# Patient Record
Sex: FEMALE | Race: WHITE | Employment: FULL TIME | URBAN - NONMETROPOLITAN AREA
[De-identification: names, ages, dates, MRNs, and addresses within clinical notes are randomized per-mention and may not be internally consistent; named-entity substitution may affect disease eponyms.]

---

## 2024-02-23 ENCOUNTER — HOSPITAL ENCOUNTER (OUTPATIENT)
Age: 29
Discharge: HOME | End: 2024-02-23
Attending: FAMILY MEDICINE
Payer: COMMERCIAL

## 2024-02-23 DIAGNOSIS — R55 SYNCOPE, UNSPECIFIED SYNCOPE TYPE: ICD-10-CM

## 2024-02-23 PROCEDURE — 93270 REMOTE 30 DAY ECG REV/REPORT: CPT

## 2025-04-07 SDOH — HEALTH STABILITY: PHYSICAL HEALTH: ON AVERAGE, HOW MANY DAYS PER WEEK DO YOU ENGAGE IN MODERATE TO STRENUOUS EXERCISE (LIKE A BRISK WALK)?: 4 DAYS

## 2025-04-07 SDOH — HEALTH STABILITY: PHYSICAL HEALTH: ON AVERAGE, HOW MANY MINUTES DO YOU ENGAGE IN EXERCISE AT THIS LEVEL?: 30 MIN

## 2025-04-09 ENCOUNTER — OFFICE VISIT (OUTPATIENT)
Dept: FAMILY MEDICINE CLINIC | Age: 30
End: 2025-04-09
Payer: COMMERCIAL

## 2025-04-09 VITALS
TEMPERATURE: 98.5 F | WEIGHT: 165.2 LBS | BODY MASS INDEX: 29.27 KG/M2 | DIASTOLIC BLOOD PRESSURE: 94 MMHG | SYSTOLIC BLOOD PRESSURE: 142 MMHG | RESPIRATION RATE: 16 BRPM | OXYGEN SATURATION: 99 % | HEART RATE: 122 BPM | HEIGHT: 63 IN

## 2025-04-09 DIAGNOSIS — R00.0 TACHYCARDIA: ICD-10-CM

## 2025-04-09 DIAGNOSIS — R55 SYNCOPE, UNSPECIFIED SYNCOPE TYPE: ICD-10-CM

## 2025-04-09 DIAGNOSIS — Z00.00 ENCOUNTER FOR MEDICAL EXAMINATION TO ESTABLISH CARE: Primary | ICD-10-CM

## 2025-04-09 PROCEDURE — 99204 OFFICE O/P NEW MOD 45 MIN: CPT | Performed by: NURSE PRACTITIONER

## 2025-04-09 PROCEDURE — 90471 IMMUNIZATION ADMIN: CPT | Performed by: NURSE PRACTITIONER

## 2025-04-09 PROCEDURE — 93000 ELECTROCARDIOGRAM COMPLETE: CPT | Performed by: NURSE PRACTITIONER

## 2025-04-09 PROCEDURE — 90715 TDAP VACCINE 7 YRS/> IM: CPT | Performed by: NURSE PRACTITIONER

## 2025-04-09 SDOH — ECONOMIC STABILITY: FOOD INSECURITY: WITHIN THE PAST 12 MONTHS, YOU WORRIED THAT YOUR FOOD WOULD RUN OUT BEFORE YOU GOT MONEY TO BUY MORE.: NEVER TRUE

## 2025-04-09 SDOH — ECONOMIC STABILITY: FOOD INSECURITY: WITHIN THE PAST 12 MONTHS, THE FOOD YOU BOUGHT JUST DIDN'T LAST AND YOU DIDN'T HAVE MONEY TO GET MORE.: NEVER TRUE

## 2025-04-09 ASSESSMENT — ENCOUNTER SYMPTOMS
PHOTOPHOBIA: 0
GASTROINTESTINAL NEGATIVE: 1
RESPIRATORY NEGATIVE: 1

## 2025-04-09 ASSESSMENT — PATIENT HEALTH QUESTIONNAIRE - PHQ9
SUM OF ALL RESPONSES TO PHQ QUESTIONS 1-9: 2
SUM OF ALL RESPONSES TO PHQ QUESTIONS 1-9: 2
1. LITTLE INTEREST OR PLEASURE IN DOING THINGS: NOT AT ALL
SUM OF ALL RESPONSES TO PHQ QUESTIONS 1-9: 2
2. FEELING DOWN, DEPRESSED OR HOPELESS: MORE THAN HALF THE DAYS
SUM OF ALL RESPONSES TO PHQ QUESTIONS 1-9: 2

## 2025-04-09 NOTE — PROGRESS NOTES
[facility]  [facility] MEDICINE  [address] [place] [address]  Dept: [phone]  Dept Fax: [phone]  Loc: [phone]    Chantale Gomez is a 29 y.o. femalewho presents today for her medical conditions/complaints as noted below.  Chantale Canchola c/o of New Patient (To get established, having syncope episodes, started a couple years ago )      :     HPI    Pt here to get established    PMH: syncope    On OCP's     Pt with Hx of syncope for the last year  Did have a normal holter test in 2024 that identified NSR and normal labs at that time, continuing to happen    States it happens randomly she will have dizziness associated with it and see black spots, sometimes she passes out and sometimes not  If she passes out it is for a few second  Denies H/A or vision changes or N/V or facial N/T with these episodes  Does have some anxiety but does not think R/T to this    Denies seizure like activity with these episodes  Stays hydrated    Did state with most recent episode her heart rate was in the 170 range.   Never had a tilt table test or echo       EKG: normal sinus rhythm.             Current Outpatient Medications   Medication Sig Dispense Refill    Levonorgestrel-Ethinyl Estrad (AVIANE PO) Take by mouth       No current facility-administered medications for this visit.       History reviewed. No pertinent past medical history.   History reviewed. No pertinent surgical history.  Family History   Problem Relation Age of Onset    Diabetes Paternal Grandmother      Social History     Tobacco Use    Smoking status: Former     Types: Cigarettes    Smokeless tobacco: Never    Tobacco comments:     pt smokes 2 ciggaretts a day   Substance Use Topics    Alcohol use: No        Allergies   Allergen Reactions    Clindamycin/Lincomycin     Pcn [Penicillins]     Doxycycline Rash       Health Maintenance   Topic Date Due    Depression Screen  Never done    Varicella vaccine (1 of

## 2025-04-09 NOTE — PROGRESS NOTES
Immunization(s) given during visit:    Immunizations Administered       Name Date Dose Route    TDaP, ADACEL (age 10y-64y), BOOSTRIX (age 10y+), IM, 0.5mL 4/9/2025 0.5 mL Intramuscular    Site: Deltoid- Left    Lot: [id number]    NDC: 42882-988-09            Most recent Vaccine Information Sheet dated 08/06/21 given to chivo

## 2025-04-18 ENCOUNTER — TELEPHONE (OUTPATIENT)
Dept: FAMILY MEDICINE CLINIC | Age: 30
End: 2025-04-18

## 2025-04-18 ENCOUNTER — RESULTS FOLLOW-UP (OUTPATIENT)
Dept: FAMILY MEDICINE CLINIC | Age: 30
End: 2025-04-18

## 2025-04-18 ENCOUNTER — HOSPITAL ENCOUNTER (OUTPATIENT)
Age: 30
Discharge: HOME | End: 2025-04-20
Payer: COMMERCIAL

## 2025-04-18 VITALS — HEIGHT: 63 IN | WEIGHT: 160 LBS | BODY MASS INDEX: 28.35 KG/M2

## 2025-04-18 DIAGNOSIS — E87.5 HYPERKALEMIA: Primary | ICD-10-CM

## 2025-04-18 DIAGNOSIS — R55 SYNCOPE, UNSPECIFIED SYNCOPE TYPE: ICD-10-CM

## 2025-04-18 DIAGNOSIS — R00.0 TACHYCARDIA: ICD-10-CM

## 2025-04-18 LAB
ALBUMIN: 4.1 G/DL (ref 3.5–5.2)
ALK PHOSPHATASE: 74 U/L (ref 30–101)
ALT SERPL-CCNC: 18 U/L (ref 5–33)
ANION GAP SERPL CALCULATED.3IONS-SCNC: 10 MMOL/L (ref 7–16)
AST SERPL-CCNC: 15 U/L (ref 9–40)
BASOPHILS ABSOLUTE: 0.03 K/UL (ref 0–0.2)
BASOPHILS RELATIVE PERCENT: 0.6 % (ref 0–2)
BILIRUB SERPL-MCNC: 0.3 MG/DL
BUN BLDV-MCNC: 14 MG/DL (ref 6–20)
CALCIUM SERPL-MCNC: 9.8 MG/DL (ref 8.6–10.5)
CHLORIDE BLD-SCNC: 107 MMOL/L (ref 96–107)
CO2: 27 MMOL/L (ref 18–32)
CREAT SERPL-MCNC: 0.75 MG/DL (ref 0.51–1.15)
ECHO BSA: 1.79 M2
EGFR IF NONAFRICAN AMERICAN: 110 ML/MIN/1.73M2
EOSINOPHILS ABSOLUTE: 0.03 K/UL (ref 0–0.8)
EOSINOPHILS RELATIVE PERCENT: 0.6 % (ref 0–5)
FOLATE: >20 NG/ML
GLUCOSE: 88 MG/DL (ref 70–100)
HCT VFR BLD CALC: 41.1 % (ref 35–47)
HEMOGLOBIN: 13.4 G/DL (ref 11.9–16)
IMMATURE GRANS (ABS): 0.01 K/UL (ref 0–0.06)
IMMATURE GRANULOCYTES %: 0.2 % (ref 0–2)
IRON: 104 UG/DL (ref 37–145)
LYMPHOCYTES ABSOLUTE: 2.01 K/UL (ref 0.9–5.2)
LYMPHOCYTES RELATIVE PERCENT: 41.3 % (ref 20–45)
MCH RBC QN AUTO: 28.6 PG (ref 26–33)
MCHC RBC AUTO-ENTMCNC: 32.6 G/DL (ref 32–35)
MCV RBC AUTO: 88 FL (ref 75–100)
MONOCYTES ABSOLUTE: 0.34 K/UL (ref 0.1–1)
MONOCYTES RELATIVE PERCENT: 7 % (ref 0–13)
NEUTROPHILS ABSOLUTE: 2.45 K/UL (ref 1.9–8)
NEUTROPHILS RELATIVE PERCENT: 50.3 % (ref 45–75)
PDW BLD-RTO: 12.5 % (ref 11.2–14.8)
PLATELET # BLD: 295 THOUS/CMM (ref 140–440)
POTASSIUM SERPL-SCNC: 5.8 MMOL/L (ref 3.5–5.4)
RBC # BLD: 4.69 MILL/CMM (ref 3.8–5.2)
SODIUM BLD-SCNC: 144 MMOL/L (ref 135–148)
TILT CV INITIAL SUPINE HEART RATE: 82 BPM
TILT CV INITIAL SUPINE MAX BP: NORMAL BPM
TILT CV INITIAL TILT BLOOD PRESSURE: NORMAL MMHG
TILT CV INITIAL TILT HEART RATE: 122 BPM
TILT CV MAX BP BLOOD PRESSURE: NORMAL MMHG
TILT CV MAX BP MINUTES: 27
TILT CV MAX BP SECONDS: 0
TILT CV MAX HEART RATE: 150 BPM
TILT CV MAX HR MINUTES: 20
TILT CV MAX HR SECONDS: 30
TILT CV MINIMUM BP BLOOD PRESSURE: NORMAL MMHG
TILT CV MINIMUM BP MINUTES: 22
TILT CV MINIMUM BP SECONDS: 30
TILT CV MINIMUM HR HEART RATE: 91 BPM
TILT CV MINIMUM HR MINUTES: 8
TILT CV MINIMUM HR SECONDS: 29
TOTAL PROTEIN: 7.4 G/DL (ref 6–8.3)
TSH SERPL DL<=0.05 MIU/L-ACNC: 0.75 UIU/ML (ref 0.27–4.2)
VITAMIN B-12: 631 PG/ML (ref 232–1245)
WBC # BLD: 4.9 THDS/CMM (ref 3.6–11)

## 2025-04-18 PROCEDURE — 93660 TILT TABLE EVALUATION: CPT

## 2025-04-18 PROCEDURE — 93660 TILT TABLE EVALUATION: CPT | Performed by: NUCLEAR MEDICINE

## 2025-04-18 NOTE — TELEPHONE ENCOUNTER
Sheldon Whittaker, APRN - CNP  [facility] Clinical Staff    Let pt know most labs are WNL, other than her K++ is slightly elevated, does she take K++ supplements as this can elevate her levels.  This could also be a transient finding, recommend repeating her labs in 1 week to recheck K++ levels, orders placed, labs are non fasting, will be in touch when results of tilt table test are back. Nothing abnormal in the labs to suggest the cause of her syncopy

## 2025-04-18 NOTE — TELEPHONE ENCOUNTER
Pt informed of results . Pt voiced understanding with no further questions at this time.     Labs faxed to [facility]

## 2025-04-21 ENCOUNTER — RESULTS FOLLOW-UP (OUTPATIENT)
Dept: FAMILY MEDICINE CLINIC | Age: 30
End: 2025-04-21

## 2025-04-21 ENCOUNTER — TELEPHONE (OUTPATIENT)
Dept: FAMILY MEDICINE CLINIC | Age: 30
End: 2025-04-21

## 2025-04-21 DIAGNOSIS — R00.0 TACHYCARDIA: ICD-10-CM

## 2025-04-21 DIAGNOSIS — R55 POSTURAL DIZZINESS WITH PRESYNCOPE: Primary | ICD-10-CM

## 2025-04-21 DIAGNOSIS — R42 POSTURAL DIZZINESS WITH PRESYNCOPE: Primary | ICD-10-CM

## 2025-04-21 NOTE — TELEPHONE ENCOUNTER
Sheldon Whittaker, APRN - CNP         Result Note  Let pt know her tilt table test did reproduce symptoms she is complaining of, but did not necessarily identify classic POTS, I do recommend cArdiology f/u to get their recommendations on tx. If agreeable will order.

## 2025-04-21 NOTE — TELEPHONE ENCOUNTER
Referral placed for external cardiology , please set up appt with [facility] cardiology and fax all recent cardia testing thanks

## 2025-04-21 NOTE — TELEPHONE ENCOUNTER
Patient informed and understanding, would like to to go to [facility] if possible. Did not have good experience with our cardiology.